# Patient Record
Sex: FEMALE | Race: WHITE | NOT HISPANIC OR LATINO | Employment: OTHER | URBAN - METROPOLITAN AREA
[De-identification: names, ages, dates, MRNs, and addresses within clinical notes are randomized per-mention and may not be internally consistent; named-entity substitution may affect disease eponyms.]

---

## 2018-05-31 ENCOUNTER — OFFICE VISIT (OUTPATIENT)
Dept: PODIATRY | Facility: CLINIC | Age: 58
End: 2018-05-31
Payer: COMMERCIAL

## 2018-05-31 VITALS — BODY MASS INDEX: 25.7 KG/M2 | HEIGHT: 63 IN | WEIGHT: 145.06 LBS

## 2018-05-31 DIAGNOSIS — M24.573 EQUINUS CONTRACTURE OF ANKLE: ICD-10-CM

## 2018-05-31 DIAGNOSIS — M72.2 PLANTAR FASCIITIS: Primary | ICD-10-CM

## 2018-05-31 PROCEDURE — 99999 PR PBB SHADOW E&M-EST. PATIENT-LVL III: CPT | Mod: PBBFAC,,, | Performed by: PODIATRIST

## 2018-05-31 PROCEDURE — 99203 OFFICE O/P NEW LOW 30 MIN: CPT | Mod: 25,S$GLB,, | Performed by: PODIATRIST

## 2018-05-31 PROCEDURE — 20550 NJX 1 TENDON SHEATH/LIGAMENT: CPT | Mod: RT,S$GLB,, | Performed by: PODIATRIST

## 2018-05-31 PROCEDURE — 3008F BODY MASS INDEX DOCD: CPT | Mod: CPTII,S$GLB,, | Performed by: PODIATRIST

## 2018-05-31 RX ORDER — PROGESTERONE 100 MG/1
100 CAPSULE ORAL NIGHTLY
Refills: 3 | COMMUNITY
Start: 2018-05-16

## 2018-05-31 RX ORDER — ESTRADIOL 0.5 MG/1
0.5 TABLET ORAL DAILY
Refills: 3 | COMMUNITY
Start: 2018-05-01

## 2018-05-31 RX ORDER — TRIAMCINOLONE ACETONIDE 40 MG/ML
20 INJECTION, SUSPENSION INTRA-ARTICULAR; INTRAMUSCULAR ONCE
Status: COMPLETED | OUTPATIENT
Start: 2018-05-31 | End: 2018-05-31

## 2018-05-31 RX ORDER — ASPIRIN 325 MG
50000 TABLET, DELAYED RELEASE (ENTERIC COATED) ORAL
Refills: 5 | COMMUNITY
Start: 2018-05-03

## 2018-05-31 RX ORDER — GABAPENTIN 300 MG/1
300 CAPSULE ORAL NIGHTLY
Refills: 5 | COMMUNITY
Start: 2018-05-09 | End: 2023-06-07

## 2018-05-31 RX ORDER — NEBIVOLOL HYDROCHLORIDE 5 MG/1
5 TABLET ORAL NIGHTLY
Refills: 5 | COMMUNITY
Start: 2018-05-04 | End: 2023-06-07

## 2018-05-31 RX ORDER — DEXAMETHASONE SODIUM PHOSPHATE 4 MG/ML
2 INJECTION, SOLUTION INTRA-ARTICULAR; INTRALESIONAL; INTRAMUSCULAR; INTRAVENOUS; SOFT TISSUE ONCE
Status: COMPLETED | OUTPATIENT
Start: 2018-05-31 | End: 2018-05-31

## 2018-05-31 RX ADMIN — DEXAMETHASONE SODIUM PHOSPHATE 2 MG: 4 INJECTION, SOLUTION INTRA-ARTICULAR; INTRALESIONAL; INTRAMUSCULAR; INTRAVENOUS; SOFT TISSUE at 11:05

## 2018-05-31 RX ADMIN — TRIAMCINOLONE ACETONIDE 20 MG: 40 INJECTION, SUSPENSION INTRA-ARTICULAR; INTRAMUSCULAR at 11:05

## 2018-05-31 NOTE — PATIENT INSTRUCTIONS
- Perform stretching exercises, see handout for details, to address tightness of calf muscles.      - Recommend wearing supportive shoes or orthopedic sandals only.  Avoid barefoot walking, flip flops, and Crocs, as this will exacerbate current symptoms.      - Recommend wearing a pair of OTC insoles.  Given both verbal and written information regarding this.    - Recommend icing the affected heel a minimum of 20 minutes daily.    - Recommend taking Ibuprofen to address pain.      - Avoid high impact activities such as squatting, stooping, and running as these activities will exacerbate symptoms.      - May consider applying a topical analgesic (Aspercream, Biofreeze, or Salonpas) to help with pain symptoms.

## 2018-05-31 NOTE — PROGRESS NOTES
Subjective:      Patient ID: Vicenta Leon is a 58 y.o. female.    Chief Complaint: Heel Pain (rt)  Patient presents to clinic with the chief complaint of Rt. Heel pain with an onset > 1 year.  Describes pain as sharp and rates as a 4/10.  Symptoms are exacerbated with weight bearing after periods of rest and alleviated with rest.  Relates improvement in symptoms with wearing a pair of sandals with a modest heel.  She has attempted to stretch and was taking ibuprofen with moderate improvement in symptoms.  Denies recent trauma to the affected limb.  Denies any additional pedal complaints.      Past Medical History:   Diagnosis Date    Hypertension        Past Surgical History:   Procedure Laterality Date    APPENDECTOMY      CHOLECYSTECTOMY      HYSTERECTOMY      TONSILLECTOMY         Family History   Problem Relation Age of Onset    Breast cancer Sister 47        several sisters    Breast cancer Sister 60    Breast cancer Sister 48       Social History     Social History    Marital status:      Spouse name: N/A    Number of children: N/A    Years of education: N/A     Social History Main Topics    Smoking status: Never Smoker    Smokeless tobacco: Never Used    Alcohol use None    Drug use: Unknown    Sexual activity: Not Asked     Other Topics Concern    None     Social History Narrative    None       Current Outpatient Prescriptions   Medication Sig Dispense Refill    BYSTOLIC 5 mg Tab Take 5 mg by mouth nightly.  5    cholecalciferol, vitamin D3, 50,000 unit capsule Take 50,000 Units by mouth every 7 days.  5    estradiol (ESTRACE) 0.5 MG tablet Take 0.5 mg by mouth once daily.  3    gabapentin (NEURONTIN) 300 MG capsule Take 300 mg by mouth nightly.  5    progesterone (PROMETRIUM) 100 MG capsule Take 100 mg by mouth nightly.  3     No current facility-administered medications for this visit.        Review of patient's allergies indicates:  Allergies not on file    Review  of Systems   Constitution: Negative for chills and fever.   Skin: Negative for color change and nail changes.   Musculoskeletal: Positive for myalgias. Negative for muscle cramps and muscle weakness.   Neurological: Negative for numbness and paresthesias.   Psychiatric/Behavioral: Negative for altered mental status.           Objective:      Physical Exam   Constitutional: She is oriented to person, place, and time. She appears well-developed and well-nourished. No distress.   Cardiovascular:   Pulses:       Dorsalis pedis pulses are 2+ on the right side, and 2+ on the left side.        Posterior tibial pulses are 2+ on the right side, and 2+ on the left side.   CFT is < 3 seconds bilateral.  Pedal hair growth is present bilateral. No lower extremity edema noted bilateral.  Toes are warm to touch bilateral.     Musculoskeletal: She exhibits tenderness. She exhibits no edema.   Muscle strength 5/5 in all muscle groups bilateral.  No tenderness nor crepitation with ROM of foot/ankle joints bilateral.  Pain with palpation of the Rt. Medial calcaneal tubercle.  Bilateral gastrocnemius equinus.  Bilateral pes cavus.    Neurological: She is alert and oriented to person, place, and time. She has normal strength. No sensory deficit.   Light touch intact bilateral.     Skin: Skin is warm, dry and intact. Capillary refill takes less than 2 seconds. No abrasion, no bruising, no burn, no ecchymosis, no laceration, no lesion, no petechiae and no rash noted. She is not diaphoretic. No erythema. No pallor.   Pedal skin has normal turgor, temperature, and texture bilateral.  Toenails x 10 appear normotrophic. Examination of the skin reveals no evidence of significant maceration, rashes, open lesions, suspicious appearing nevi or other concerning lesions.              Assessment:       Encounter Diagnoses   Name Primary?    Plantar fasciitis Yes    Equinus contracture of ankle          Plan:       Vicenta was seen today for heel  pain.    Diagnoses and all orders for this visit:    Plantar fasciitis  -     dexamethasone injection 2 mg; 0.5 mLs (2 mg total) by Other route once.  -     triamcinolone acetonide injection 20 mg; 0.5 mLs (20 mg total) by Other route once.    Equinus contracture of ankle      I counseled the patient on her conditions, their implications and medical management.    - After sterilizing the area with an alcohol prep and applying ethyl chloride, the affected area of the Rt. Medial heel was injected as per MAR.  The patient tolerated the injection well, with minimal blood loss noted from the injection site.  The injection site was then covered with a bandage.  Patient tolerated this quite well.        - Discussed performing stretching exercises to address bilateral equinus.     - Recommend wearing supportive shoes or orthopedic sandals only.  Discussed avoidance of barefoot walking, flip flops, and Crocs, as this will exacerbate current symptoms.       - Recommend wearing a pair of OTC insoles.  Given both verbal and written information regarding this.     - Recommend icing the affected heel a minimum of 20 minutes daily.     - Recommend taking a nsaid to address pain/inflammation.    - Discussed avoidance of high impact activities such as squatting, stooping, and running as these activities will exacerbate symptoms.       - May consider applying a topical analgesic (Aspercream, Biofreeze, or Salonpas) to help with pain symptoms.       - RTC prn or sooner if symptoms fail to resolve within 6 weeks.  Will consider a 2nd corticosteroid injection and/or PT at that time.     Loc Gan DPM